# Patient Record
(demographics unavailable — no encounter records)

---

## 2025-01-02 NOTE — HISTORY OF PRESENT ILLNESS
[FreeTextEntry1] : Pt here for f/u of DMPA which she started for management of seizure disorder. No seizures since starting DMPA, mood is fine, has had weight gain. Wants to stay on it.

## 2025-05-30 NOTE — HISTORY OF PRESENT ILLNESS
[de-identified] :   The date of disposal May 30, 2025  Name of medication: Actemra  Vial size: 400mg  number of vials: 1  Pharm[facility]: [facility]  Rx# [id number]   Rx date: 24  NDC: 71657-462-54          Lot: [id number]                                   Exp: 2025  Reason for disposal of medication: Medication   Medication disposed of according to health system guidelines.

## 2025-07-29 NOTE — PHYSICAL EXAM
[Alert] : alert [No Acute Distress] : no acute distress [Soft] : soft [Non-tender] : non-tender [Examination Of The Breasts] : a normal appearance [Normal] : normal [No Masses] : no breast masses were palpable

## 2025-07-29 NOTE — HISTORY OF PRESENT ILLNESS
[TextBox_4] : Pt has been on DMPA for past year to help with seizure disorder which was occurring regularly with menses. She has had no seizures since starting DMPA and also no vb. She is very happy with it. Taking vit d, for exercise she kickboxes sometimes and sometimes goes to the gym.  [PapSmeardate] : 2024 [Never active] : never active